# Patient Record
Sex: FEMALE | Race: WHITE
[De-identification: names, ages, dates, MRNs, and addresses within clinical notes are randomized per-mention and may not be internally consistent; named-entity substitution may affect disease eponyms.]

---

## 2018-12-13 ENCOUNTER — HOSPITAL ENCOUNTER (OUTPATIENT)
Age: 23
End: 2018-12-13
Payer: COMMERCIAL

## 2018-12-13 VITALS — BODY MASS INDEX: 24.9 KG/M2

## 2018-12-13 DIAGNOSIS — Z34.00: Primary | ICD-10-CM

## 2018-12-13 DIAGNOSIS — Z12.4: ICD-10-CM

## 2018-12-13 LAB
DEPRECATED RDW RBC: 43 FL (ref 35.1–43.9)
DIFFERENTIAL INDICATED: (no result)
ERYTHROCYTE [DISTWIDTH] IN BLOOD: 13.4 % (ref 11.6–14.6)
HCT VFR BLD AUTO: 39.9 % (ref 37–47)
HEMOGLOBIN: 13.5 G/DL (ref 12–15)
HGB BLD-MCNC: 13.5 G/DL (ref 12–15)
IMMATURE GRANULOCYTES COUNT: 0.01 X10^3/UL (ref 0–0)
MCV RBC: 88.9 FL (ref 81–99)
MEAN CORP HGB CONC: 33.8 G/GL (ref 32–36)
MEAN PLATELET VOL.: 11 FL (ref 6.2–12)
PLATELET # BLD: 220 K/MM3 (ref 150–450)
PLATELET COUNT: 220 K/MM3 (ref 150–450)
POSITIVE COUNT: NO
POSITIVE DIFFERENTIAL: NO
POSITIVE MORPHOLOGY: NO
RBC # BLD AUTO: 4.49 M/MM3 (ref 4.2–5.4)
RBC DISTRIBUTION WIDTH CV: 13.4 % (ref 11.6–14.6)
RBC DISTRIBUTION WIDTH SD: 43 FL (ref 35.1–43.9)
SAMPLE ADEQUACY CONTROL: (no result)
WBC # BLD AUTO: 8.2 K/MM3 (ref 4.4–11)
WHITE BLOOD COUNT: 8.2 K/MM3 (ref 4.4–11)

## 2018-12-13 PROCEDURE — 88175 CYTOPATH C/V AUTO FLUID REDO: CPT

## 2018-12-13 PROCEDURE — 87088 URINE BACTERIA CULTURE: CPT

## 2018-12-13 PROCEDURE — 86900 BLOOD TYPING SEROLOGIC ABO: CPT

## 2018-12-13 PROCEDURE — 86850 RBC ANTIBODY SCREEN: CPT

## 2018-12-13 PROCEDURE — 87340 HEPATITIS B SURFACE AG IA: CPT

## 2018-12-13 PROCEDURE — 86703 HIV-1/HIV-2 1 RESULT ANTBDY: CPT

## 2018-12-13 PROCEDURE — 87491 CHLMYD TRACH DNA AMP PROBE: CPT

## 2018-12-13 PROCEDURE — 86592 SYPHILIS TEST NON-TREP QUAL: CPT

## 2018-12-13 PROCEDURE — 87591 N.GONORRHOEAE DNA AMP PROB: CPT

## 2018-12-13 PROCEDURE — 86762 RUBELLA ANTIBODY: CPT

## 2018-12-13 PROCEDURE — 85025 COMPLETE CBC W/AUTO DIFF WBC: CPT

## 2018-12-13 PROCEDURE — 36415 COLL VENOUS BLD VENIPUNCTURE: CPT

## 2018-12-13 PROCEDURE — 87086 URINE CULTURE/COLONY COUNT: CPT

## 2018-12-13 PROCEDURE — G0145 SCR C/V CYTO,THINLAYER,RESCR: HCPCS

## 2018-12-13 PROCEDURE — 87077 CULTURE AEROBIC IDENTIFY: CPT

## 2018-12-13 PROCEDURE — 87186 SC STD MICRODIL/AGAR DIL: CPT

## 2018-12-13 PROCEDURE — 87624 HPV HI-RISK TYP POOLED RSLT: CPT

## 2018-12-14 LAB — RAPID PLASMIN REAGIN (RPR): NONREACTIVE

## 2019-01-14 ENCOUNTER — HOSPITAL ENCOUNTER (OUTPATIENT)
Dept: HOSPITAL 100 - SDC | Age: 24
Discharge: HOME | End: 2019-01-14
Payer: COMMERCIAL

## 2019-01-14 VITALS
DIASTOLIC BLOOD PRESSURE: 78 MMHG | HEART RATE: 81 BPM | OXYGEN SATURATION: 100 % | TEMPERATURE: 98.42 F | RESPIRATION RATE: 16 BRPM | SYSTOLIC BLOOD PRESSURE: 137 MMHG

## 2019-01-14 VITALS
RESPIRATION RATE: 18 BRPM | DIASTOLIC BLOOD PRESSURE: 76 MMHG | HEART RATE: 86 BPM | OXYGEN SATURATION: 100 % | SYSTOLIC BLOOD PRESSURE: 127 MMHG

## 2019-01-14 VITALS
RESPIRATION RATE: 16 BRPM | DIASTOLIC BLOOD PRESSURE: 78 MMHG | SYSTOLIC BLOOD PRESSURE: 133 MMHG | OXYGEN SATURATION: 100 % | HEART RATE: 78 BPM | TEMPERATURE: 98.96 F

## 2019-01-14 VITALS
OXYGEN SATURATION: 100 % | RESPIRATION RATE: 18 BRPM | HEART RATE: 76 BPM | TEMPERATURE: 98.24 F | SYSTOLIC BLOOD PRESSURE: 127 MMHG | DIASTOLIC BLOOD PRESSURE: 78 MMHG

## 2019-01-14 VITALS — BODY MASS INDEX: 24.9 KG/M2

## 2019-01-14 VITALS
RESPIRATION RATE: 18 BRPM | TEMPERATURE: 98.2 F | DIASTOLIC BLOOD PRESSURE: 64 MMHG | HEART RATE: 90 BPM | OXYGEN SATURATION: 100 % | SYSTOLIC BLOOD PRESSURE: 133 MMHG

## 2019-01-14 VITALS
DIASTOLIC BLOOD PRESSURE: 67 MMHG | OXYGEN SATURATION: 100 % | RESPIRATION RATE: 18 BRPM | SYSTOLIC BLOOD PRESSURE: 133 MMHG | HEART RATE: 87 BPM

## 2019-01-14 DIAGNOSIS — O03.4: Primary | ICD-10-CM

## 2019-01-14 DIAGNOSIS — Z3A.08: ICD-10-CM

## 2019-01-14 DIAGNOSIS — O99.281: ICD-10-CM

## 2019-01-14 DIAGNOSIS — E06.3: ICD-10-CM

## 2019-01-14 DIAGNOSIS — Z79.899: ICD-10-CM

## 2019-01-14 PROCEDURE — 88305 TISSUE EXAM BY PATHOLOGIST: CPT

## 2019-01-14 PROCEDURE — 59812 TREATMENT OF MISCARRIAGE: CPT

## 2019-05-07 ENCOUNTER — HOSPITAL ENCOUNTER (OUTPATIENT)
Age: 24
End: 2019-05-07
Payer: COMMERCIAL

## 2019-05-07 VITALS — BODY MASS INDEX: 24.9 KG/M2

## 2019-05-07 DIAGNOSIS — Z3A.00: ICD-10-CM

## 2019-05-07 DIAGNOSIS — O20.0: Primary | ICD-10-CM

## 2019-05-07 LAB — HCG SERPL QL: 31 MIU/ML (ref 1–3)

## 2019-05-07 PROCEDURE — 36415 COLL VENOUS BLD VENIPUNCTURE: CPT

## 2019-05-07 PROCEDURE — 86850 RBC ANTIBODY SCREEN: CPT

## 2019-05-07 PROCEDURE — 84702 CHORIONIC GONADOTROPIN TEST: CPT

## 2019-05-07 PROCEDURE — 86900 BLOOD TYPING SEROLOGIC ABO: CPT

## 2019-05-09 ENCOUNTER — HOSPITAL ENCOUNTER (OUTPATIENT)
Age: 24
End: 2019-05-09
Payer: COMMERCIAL

## 2019-05-09 VITALS — BODY MASS INDEX: 24.9 KG/M2

## 2019-05-09 DIAGNOSIS — O20.0: Primary | ICD-10-CM

## 2019-05-09 LAB — HCG SERPL QL: 95 MIU/ML (ref 1–3)

## 2019-05-09 PROCEDURE — 36415 COLL VENOUS BLD VENIPUNCTURE: CPT

## 2019-05-09 PROCEDURE — 84702 CHORIONIC GONADOTROPIN TEST: CPT

## 2019-05-28 ENCOUNTER — HOSPITAL ENCOUNTER (OUTPATIENT)
Age: 24
End: 2019-05-28
Payer: COMMERCIAL

## 2019-05-28 VITALS — BODY MASS INDEX: 24.9 KG/M2

## 2019-05-28 DIAGNOSIS — Z34.90: Primary | ICD-10-CM

## 2019-05-28 LAB
DEPRECATED RDW RBC: 42.7 FL (ref 35.1–43.9)
DIFFERENTIAL INDICATED: (no result)
ERYTHROCYTE [DISTWIDTH] IN BLOOD: 13.5 % (ref 11.6–14.6)
HCT VFR BLD AUTO: 39.2 % (ref 37–47)
HEMOGLOBIN: 13 G/DL (ref 12–15)
HGB BLD-MCNC: 13 G/DL (ref 12–15)
IMMATURE GRANULOCYTES COUNT: 0.02 X10^3/UL (ref 0–0)
MCV RBC: 86.3 FL (ref 81–99)
MEAN CORP HGB CONC: 33.2 G/GL (ref 32–36)
MEAN PLATELET VOL.: 10.8 FL (ref 6.2–12)
PLATELET # BLD: 218 K/MM3 (ref 150–450)
PLATELET COUNT: 218 K/MM3 (ref 150–450)
POSITIVE COUNT: NO
POSITIVE DIFFERENTIAL: NO
POSITIVE MORPHOLOGY: NO
RBC # BLD AUTO: 4.54 M/MM3 (ref 4.2–5.4)
RBC DISTRIBUTION WIDTH CV: 13.5 % (ref 11.6–14.6)
RBC DISTRIBUTION WIDTH SD: 42.7 FL (ref 35.1–43.9)
SAMPLE ADEQUACY CONTROL: (no result)
WBC # BLD AUTO: 8.2 K/MM3 (ref 4.4–11)
WHITE BLOOD COUNT: 8.2 K/MM3 (ref 4.4–11)

## 2019-05-28 PROCEDURE — 86850 RBC ANTIBODY SCREEN: CPT

## 2019-05-28 PROCEDURE — 87088 URINE BACTERIA CULTURE: CPT

## 2019-05-28 PROCEDURE — 87186 SC STD MICRODIL/AGAR DIL: CPT

## 2019-05-28 PROCEDURE — 86592 SYPHILIS TEST NON-TREP QUAL: CPT

## 2019-05-28 PROCEDURE — 87491 CHLMYD TRACH DNA AMP PROBE: CPT

## 2019-05-28 PROCEDURE — 86703 HIV-1/HIV-2 1 RESULT ANTBDY: CPT

## 2019-05-28 PROCEDURE — 87591 N.GONORRHOEAE DNA AMP PROB: CPT

## 2019-05-28 PROCEDURE — 86762 RUBELLA ANTIBODY: CPT

## 2019-05-28 PROCEDURE — 36415 COLL VENOUS BLD VENIPUNCTURE: CPT

## 2019-05-28 PROCEDURE — 86900 BLOOD TYPING SEROLOGIC ABO: CPT

## 2019-05-28 PROCEDURE — 87077 CULTURE AEROBIC IDENTIFY: CPT

## 2019-05-28 PROCEDURE — 85025 COMPLETE CBC W/AUTO DIFF WBC: CPT

## 2019-05-28 PROCEDURE — 87086 URINE CULTURE/COLONY COUNT: CPT

## 2019-05-28 PROCEDURE — 87340 HEPATITIS B SURFACE AG IA: CPT

## 2019-05-31 LAB — RAPID PLASMIN REAGIN (RPR): NONREACTIVE

## 2019-07-15 ENCOUNTER — HOSPITAL ENCOUNTER (OUTPATIENT)
Age: 24
End: 2019-07-15
Payer: COMMERCIAL

## 2019-07-15 VITALS — BODY MASS INDEX: 25.5 KG/M2

## 2019-07-15 DIAGNOSIS — N93.9: Primary | ICD-10-CM

## 2019-07-15 PROCEDURE — 76816 OB US FOLLOW-UP PER FETUS: CPT

## 2019-10-15 ENCOUNTER — HOSPITAL ENCOUNTER (OUTPATIENT)
Age: 24
End: 2019-10-15
Payer: COMMERCIAL

## 2019-10-15 VITALS — BODY MASS INDEX: 26.6 KG/M2

## 2019-10-15 DIAGNOSIS — O09.91: Primary | ICD-10-CM

## 2019-10-15 DIAGNOSIS — Z3A.00: ICD-10-CM

## 2019-10-15 LAB
DEPRECATED RDW RBC: 42.7 FL (ref 35.1–43.9)
ERYTHROCYTE [DISTWIDTH] IN BLOOD: 12.6 % (ref 11.6–14.6)
GLUCOSE 1H P 50 G GLC PO SERPL-MCNC: 85 MG/DL (ref 70–140)
HCT VFR BLD AUTO: 33.7 % (ref 37–47)
HEMOGLOBIN: 11.2 G/DL (ref 12–15)
HGB BLD-MCNC: 11.2 G/DL (ref 12–15)
IMMATURE GRANULOCYTES COUNT: 0.03 X10^3/UL (ref 0–0)
MCV RBC: 92.8 FL (ref 81–99)
MEAN CORP HGB CONC: 33.2 G/DL (ref 32–36)
MEAN PLATELET VOL.: 10.2 FL (ref 6.2–12)
NRBC FLAGGED BY ANALYZER: 0 % (ref 0–5)
PLATELET # BLD: 167 K/MM3 (ref 150–450)
PLATELET COUNT: 167 K/MM3 (ref 150–450)
RBC # BLD AUTO: 3.63 M/MM3 (ref 4.2–5.4)
RBC DISTRIBUTION WIDTH CV: 12.6 % (ref 11.6–14.6)
RBC DISTRIBUTION WIDTH SD: 42.7 FL (ref 35.1–43.9)
WBC # BLD AUTO: 10.4 K/MM3 (ref 4.4–11)
WHITE BLOOD COUNT: 10.4 K/MM3 (ref 4.4–11)

## 2019-10-15 PROCEDURE — 36415 COLL VENOUS BLD VENIPUNCTURE: CPT

## 2019-10-15 PROCEDURE — 85025 COMPLETE CBC W/AUTO DIFF WBC: CPT

## 2019-10-15 PROCEDURE — 82950 GLUCOSE TEST: CPT

## 2019-12-20 ENCOUNTER — HOSPITAL ENCOUNTER (OUTPATIENT)
Age: 24
End: 2019-12-20
Payer: COMMERCIAL

## 2019-12-20 VITALS — BODY MASS INDEX: 26.6 KG/M2

## 2019-12-20 DIAGNOSIS — Z3A.00: ICD-10-CM

## 2019-12-20 DIAGNOSIS — O09.91: Primary | ICD-10-CM

## 2019-12-20 PROCEDURE — 76817 TRANSVAGINAL US OBSTETRIC: CPT

## 2019-12-20 PROCEDURE — 76816 OB US FOLLOW-UP PER FETUS: CPT

## 2019-12-27 ENCOUNTER — HOSPITAL ENCOUNTER (OUTPATIENT)
Age: 24
Discharge: HOME | End: 2019-12-27
Payer: COMMERCIAL

## 2019-12-27 VITALS — BODY MASS INDEX: 29.9 KG/M2 | BODY MASS INDEX: 26.6 KG/M2

## 2019-12-27 LAB
DEPRECATED RDW RBC: 42.9 FL (ref 35.1–43.9)
ERYTHROCYTE [DISTWIDTH] IN BLOOD: 13.7 % (ref 11.6–14.6)
FIBRINOGEN PPP COAG.DERIVED-MCNC: 374 MG/DL (ref 203–444)
FIBRINOGEN: 374 MG/DL (ref 203–444)
HCT VFR BLD AUTO: 31.9 % (ref 37–47)
HEMOGLOBIN: 10.1 G/DL (ref 12–15)
HGB BLD-MCNC: 10.1 G/DL (ref 12–15)
IMMATURE GRANULOCYTES COUNT: 0.04 X10^3/UL (ref 0–0)
MCV RBC: 87.2 FL (ref 81–99)
MEAN CORP HGB CONC: 31.7 G/DL (ref 32–36)
MEAN PLATELET VOL.: 10.1 FL (ref 6.2–12)
NRBC FLAGGED BY ANALYZER: 0 % (ref 0–5)
PLATELET # BLD: 158 K/MM3 (ref 150–450)
PLATELET COUNT: 158 K/MM3 (ref 150–450)
RBC # BLD AUTO: 3.66 M/MM3 (ref 4.2–5.4)
RBC DISTRIBUTION WIDTH CV: 13.7 % (ref 11.6–14.6)
RBC DISTRIBUTION WIDTH SD: 42.9 FL (ref 35.1–43.9)
WBC # BLD AUTO: 9.4 K/MM3 (ref 4.4–11)
WHITE BLOOD COUNT: 9.4 K/MM3 (ref 4.4–11)

## 2019-12-27 PROCEDURE — 85025 COMPLETE CBC W/AUTO DIFF WBC: CPT

## 2019-12-27 PROCEDURE — 85384 FIBRINOGEN ACTIVITY: CPT

## 2019-12-27 PROCEDURE — 59050 FETAL MONITOR W/REPORT: CPT

## 2019-12-27 PROCEDURE — 59025 FETAL NON-STRESS TEST: CPT

## 2019-12-27 PROCEDURE — 99218: CPT

## 2019-12-27 PROCEDURE — G0378 HOSPITAL OBSERVATION PER HR: HCPCS

## 2019-12-30 ENCOUNTER — HOSPITAL ENCOUNTER (OUTPATIENT)
Dept: HOSPITAL 100 - WPOUT | Age: 24
Discharge: HOME | End: 2019-12-30
Payer: COMMERCIAL

## 2019-12-30 VITALS — BODY MASS INDEX: 29.9 KG/M2 | BODY MASS INDEX: 29.8 KG/M2

## 2019-12-30 DIAGNOSIS — O26.893: Primary | ICD-10-CM

## 2019-12-30 DIAGNOSIS — R03.0: ICD-10-CM

## 2019-12-30 DIAGNOSIS — Z3A.00: ICD-10-CM

## 2019-12-30 LAB
ALANINE AMINOTRANSFER ALT/SGPT: 15 U/L (ref 13–56)
APTT PPP: 28.4 SECONDS (ref 24.1–36.2)
AST(SGOT): 16 U/L (ref 15–37)
CREAT UR-MCNC: 37.9 MG/DL
DEPRECATED RDW RBC: 42.5 FL (ref 35.1–43.9)
ERYTHROCYTE [DISTWIDTH] IN BLOOD: 13.6 % (ref 11.6–14.6)
EST GLOM FILT RATE - AFR AMER: 141 ML/MIN (ref 60–?)
ESTIMATED CREATININE CLEARANCE: 114.48 ML/MIN
HCT VFR BLD AUTO: 30.9 % (ref 37–47)
HEMOGLOBIN: 9.7 G/DL (ref 12–15)
HGB BLD-MCNC: 9.7 G/DL (ref 12–15)
MCV RBC: 85.4 FL (ref 81–99)
MEAN CORP HGB CONC: 31.4 G/DL (ref 32–36)
MEAN PLATELET VOL.: 10.1 FL (ref 6.2–12)
PLATELET # BLD: 141 K/MM3 (ref 150–450)
PLATELET COUNT: 141 K/MM3 (ref 150–450)
PROT UR-MCNC: 8.5 MG/DL (ref ?–11.9)
PROT/CREAT UR: 224 MG/G CRE (ref 0–200)
PROTHROMBIN TIME (PROTIME)PT.: 12.9 SECONDS (ref 11.7–14.9)
RBC # BLD AUTO: 3.62 M/MM3 (ref 4.2–5.4)
RBC DISTRIBUTION WIDTH CV: 13.6 % (ref 11.6–14.6)
RBC DISTRIBUTION WIDTH SD: 42.5 FL (ref 35.1–43.9)
URATE SERPL-MCNC: 5.4 MG/DL (ref 2.6–6)
WBC # BLD AUTO: 8.8 K/MM3 (ref 4.4–11)
WHITE BLOOD COUNT: 8.8 K/MM3 (ref 4.4–11)

## 2019-12-30 PROCEDURE — G0378 HOSPITAL OBSERVATION PER HR: HCPCS

## 2019-12-30 PROCEDURE — 84550 ASSAY OF BLOOD/URIC ACID: CPT

## 2019-12-30 PROCEDURE — 85730 THROMBOPLASTIN TIME PARTIAL: CPT

## 2019-12-30 PROCEDURE — 84460 ALANINE AMINO (ALT) (SGPT): CPT

## 2019-12-30 PROCEDURE — 99218: CPT

## 2019-12-30 PROCEDURE — 84156 ASSAY OF PROTEIN URINE: CPT

## 2019-12-30 PROCEDURE — 82565 ASSAY OF CREATININE: CPT

## 2019-12-30 PROCEDURE — 36415 COLL VENOUS BLD VENIPUNCTURE: CPT

## 2019-12-30 PROCEDURE — 84450 TRANSFERASE (AST) (SGOT): CPT

## 2019-12-30 PROCEDURE — 59025 FETAL NON-STRESS TEST: CPT

## 2019-12-30 PROCEDURE — 59050 FETAL MONITOR W/REPORT: CPT

## 2019-12-30 PROCEDURE — 85610 PROTHROMBIN TIME: CPT

## 2019-12-30 PROCEDURE — 85027 COMPLETE CBC AUTOMATED: CPT

## 2019-12-30 PROCEDURE — 82570 ASSAY OF URINE CREATININE: CPT

## 2020-01-04 ENCOUNTER — HOSPITAL ENCOUNTER (INPATIENT)
Dept: HOSPITAL 100 - WPOUT | Age: 25
LOS: 1 days | Discharge: HOME | End: 2020-01-05
Payer: COMMERCIAL

## 2020-01-04 VITALS
HEART RATE: 82 BPM | TEMPERATURE: 98.24 F | DIASTOLIC BLOOD PRESSURE: 68 MMHG | RESPIRATION RATE: 16 BRPM | SYSTOLIC BLOOD PRESSURE: 130 MMHG

## 2020-01-04 VITALS
HEART RATE: 91 BPM | TEMPERATURE: 97.9 F | SYSTOLIC BLOOD PRESSURE: 125 MMHG | RESPIRATION RATE: 16 BRPM | DIASTOLIC BLOOD PRESSURE: 67 MMHG

## 2020-01-04 VITALS — BODY MASS INDEX: 29.8 KG/M2 | BODY MASS INDEX: 29.7 KG/M2

## 2020-01-04 DIAGNOSIS — Z79.899: ICD-10-CM

## 2020-01-04 DIAGNOSIS — E06.3: ICD-10-CM

## 2020-01-04 DIAGNOSIS — Z3A.38: ICD-10-CM

## 2020-01-04 DIAGNOSIS — D64.9: ICD-10-CM

## 2020-01-04 LAB
DEPRECATED RDW RBC: 43 FL (ref 35.1–43.9)
ERYTHROCYTE [DISTWIDTH] IN BLOOD: 13.9 % (ref 11.6–14.6)
HCT VFR BLD AUTO: 30.3 % (ref 37–47)
HEMOGLOBIN: 9.6 G/DL (ref 12–15)
HGB BLD-MCNC: 9.6 G/DL (ref 12–15)
IMMATURE GRANULOCYTES COUNT: 0.04 X10^3/UL (ref 0–0)
MCV RBC: 84.6 FL (ref 81–99)
MEAN CORP HGB CONC: 31.7 G/DL (ref 32–36)
MEAN PLATELET VOL.: 10.3 FL (ref 6.2–12)
NRBC FLAGGED BY ANALYZER: 0 % (ref 0–5)
PLATELET # BLD: 152 K/MM3 (ref 150–450)
PLATELET COUNT: 152 K/MM3 (ref 150–450)
RBC # BLD AUTO: 3.58 M/MM3 (ref 4.2–5.4)
RBC DISTRIBUTION WIDTH CV: 13.9 % (ref 11.6–14.6)
RBC DISTRIBUTION WIDTH SD: 43 FL (ref 35.1–43.9)
ROM INTERNAL CONTROL TEST: (no result)
ROM PATIENT TEST: POSITIVE
WBC # BLD AUTO: 10.9 K/MM3 (ref 4.4–11)
WHITE BLOOD COUNT: 10.9 K/MM3 (ref 4.4–11)

## 2020-01-04 PROCEDURE — 59050 FETAL MONITOR W/REPORT: CPT

## 2020-01-04 PROCEDURE — 86901 BLOOD TYPING SEROLOGIC RH(D): CPT

## 2020-01-04 PROCEDURE — 59025 FETAL NON-STRESS TEST: CPT

## 2020-01-04 PROCEDURE — 84112 EVAL AMNIOTIC FLUID PROTEIN: CPT

## 2020-01-04 PROCEDURE — G0378 HOSPITAL OBSERVATION PER HR: HCPCS

## 2020-01-04 PROCEDURE — 85025 COMPLETE CBC W/AUTO DIFF WBC: CPT

## 2020-01-04 PROCEDURE — 86900 BLOOD TYPING SEROLOGIC ABO: CPT

## 2020-01-04 PROCEDURE — 99218: CPT

## 2020-01-04 PROCEDURE — 86850 RBC ANTIBODY SCREEN: CPT

## 2020-01-04 RX ADMIN — Medication 0 ML: at 08:33

## 2020-01-05 VITALS
HEART RATE: 83 BPM | RESPIRATION RATE: 16 BRPM | DIASTOLIC BLOOD PRESSURE: 69 MMHG | SYSTOLIC BLOOD PRESSURE: 141 MMHG | TEMPERATURE: 98.24 F

## 2020-01-05 VITALS
RESPIRATION RATE: 16 BRPM | HEART RATE: 81 BPM | TEMPERATURE: 98 F | DIASTOLIC BLOOD PRESSURE: 71 MMHG | SYSTOLIC BLOOD PRESSURE: 121 MMHG

## 2020-01-05 VITALS
RESPIRATION RATE: 16 BRPM | TEMPERATURE: 98.1 F | SYSTOLIC BLOOD PRESSURE: 129 MMHG | HEART RATE: 97 BPM | DIASTOLIC BLOOD PRESSURE: 76 MMHG

## 2020-01-05 VITALS
HEART RATE: 88 BPM | TEMPERATURE: 98.24 F | SYSTOLIC BLOOD PRESSURE: 129 MMHG | DIASTOLIC BLOOD PRESSURE: 57 MMHG | RESPIRATION RATE: 16 BRPM

## 2020-01-05 RX ADMIN — DOCUSATE SODIUM 50 MG AND SENNOSIDES 8.6 MG 0 TABLET: 8.6; 5 TABLET, FILM COATED ORAL at 07:39

## 2020-01-08 ENCOUNTER — HOSPITAL ENCOUNTER (OUTPATIENT)
Age: 25
End: 2020-01-08
Payer: COMMERCIAL

## 2020-01-08 VITALS — BODY MASS INDEX: 29.7 KG/M2

## 2020-01-08 DIAGNOSIS — Z3A.00: ICD-10-CM

## 2020-01-08 DIAGNOSIS — O99.019: Primary | ICD-10-CM

## 2020-01-08 DIAGNOSIS — D64.9: ICD-10-CM

## 2020-01-08 LAB
DEPRECATED RDW RBC: 45.1 FL (ref 35.1–43.9)
ERYTHROCYTE [DISTWIDTH] IN BLOOD: 14.1 % (ref 11.6–14.6)
HCT VFR BLD AUTO: 27.3 % (ref 37–47)
HEMOGLOBIN: 8.3 G/DL (ref 12–15)
HGB BLD-MCNC: 8.3 G/DL (ref 12–15)
IMMATURE GRANULOCYTES COUNT: 0.04 X10^3/UL (ref 0–0)
MCV RBC: 88.9 FL (ref 81–99)
MEAN CORP HGB CONC: 30.4 G/DL (ref 32–36)
MEAN PLATELET VOL.: 10.5 FL (ref 6.2–12)
NRBC FLAGGED BY ANALYZER: 0 % (ref 0–5)
PLATELET # BLD: 182 K/MM3 (ref 150–450)
PLATELET COUNT: 182 K/MM3 (ref 150–450)
RBC # BLD AUTO: 3.07 M/MM3 (ref 4.2–5.4)
RBC DISTRIBUTION WIDTH CV: 14.1 % (ref 11.6–14.6)
RBC DISTRIBUTION WIDTH SD: 45.1 FL (ref 35.1–43.9)
WBC # BLD AUTO: 9 K/MM3 (ref 4.4–11)
WHITE BLOOD COUNT: 9 K/MM3 (ref 4.4–11)

## 2020-01-08 PROCEDURE — 36415 COLL VENOUS BLD VENIPUNCTURE: CPT

## 2020-01-08 PROCEDURE — 85025 COMPLETE CBC W/AUTO DIFF WBC: CPT

## 2020-01-10 ENCOUNTER — HOSPITAL ENCOUNTER (OUTPATIENT)
Age: 25
End: 2020-01-10
Payer: COMMERCIAL

## 2020-01-10 VITALS — BODY MASS INDEX: 28.3 KG/M2 | BODY MASS INDEX: 29.7 KG/M2

## 2020-01-10 VITALS — DIASTOLIC BLOOD PRESSURE: 71 MMHG | HEART RATE: 70 BPM | SYSTOLIC BLOOD PRESSURE: 118 MMHG | RESPIRATION RATE: 16 BRPM

## 2020-01-10 VITALS
DIASTOLIC BLOOD PRESSURE: 76 MMHG | TEMPERATURE: 98.2 F | SYSTOLIC BLOOD PRESSURE: 119 MMHG | RESPIRATION RATE: 16 BRPM | HEART RATE: 76 BPM

## 2020-01-10 DIAGNOSIS — D64.9: ICD-10-CM

## 2020-01-10 PROCEDURE — 96365 THER/PROPH/DIAG IV INF INIT: CPT

## 2020-01-10 PROCEDURE — A4216 STERILE WATER/SALINE, 10 ML: HCPCS

## 2020-01-10 PROCEDURE — 96366 THER/PROPH/DIAG IV INF ADDON: CPT

## 2020-01-16 ENCOUNTER — HOSPITAL ENCOUNTER (OUTPATIENT)
Age: 25
End: 2020-01-16
Payer: COMMERCIAL

## 2020-01-16 VITALS — BODY MASS INDEX: 28.3 KG/M2

## 2020-01-16 DIAGNOSIS — N39.0: Primary | ICD-10-CM

## 2020-01-16 PROCEDURE — 87088 URINE BACTERIA CULTURE: CPT

## 2020-01-16 PROCEDURE — 87086 URINE CULTURE/COLONY COUNT: CPT

## 2020-01-17 ENCOUNTER — HOSPITAL ENCOUNTER (OUTPATIENT)
Age: 25
End: 2020-01-17
Payer: COMMERCIAL

## 2020-01-17 VITALS
RESPIRATION RATE: 16 BRPM | TEMPERATURE: 97.1 F | HEART RATE: 75 BPM | DIASTOLIC BLOOD PRESSURE: 64 MMHG | SYSTOLIC BLOOD PRESSURE: 134 MMHG | OXYGEN SATURATION: 99 %

## 2020-01-17 VITALS — BODY MASS INDEX: 28.3 KG/M2 | BODY MASS INDEX: 26.6 KG/M2

## 2020-01-17 DIAGNOSIS — D64.9: ICD-10-CM

## 2020-01-17 PROCEDURE — A4216 STERILE WATER/SALINE, 10 ML: HCPCS

## 2020-01-17 PROCEDURE — 96366 THER/PROPH/DIAG IV INF ADDON: CPT

## 2020-01-17 PROCEDURE — 96365 THER/PROPH/DIAG IV INF INIT: CPT

## 2020-01-21 ENCOUNTER — HOSPITAL ENCOUNTER (OUTPATIENT)
Age: 25
End: 2020-01-21
Payer: COMMERCIAL

## 2020-01-21 VITALS — BODY MASS INDEX: 26.1 KG/M2

## 2020-01-21 DIAGNOSIS — O92.79: Primary | ICD-10-CM

## 2020-01-21 PROCEDURE — 96152: CPT

## 2020-01-24 ENCOUNTER — HOSPITAL ENCOUNTER (OUTPATIENT)
Age: 25
End: 2020-01-24
Payer: COMMERCIAL

## 2020-01-24 VITALS — BODY MASS INDEX: 28.3 KG/M2 | BODY MASS INDEX: 26.1 KG/M2 | BODY MASS INDEX: 26.6 KG/M2

## 2020-01-24 VITALS
HEART RATE: 74 BPM | RESPIRATION RATE: 16 BRPM | DIASTOLIC BLOOD PRESSURE: 70 MMHG | TEMPERATURE: 98 F | SYSTOLIC BLOOD PRESSURE: 134 MMHG

## 2020-01-24 VITALS — RESPIRATION RATE: 18 BRPM | DIASTOLIC BLOOD PRESSURE: 67 MMHG | HEART RATE: 71 BPM | SYSTOLIC BLOOD PRESSURE: 116 MMHG

## 2020-01-24 PROCEDURE — 96366 THER/PROPH/DIAG IV INF ADDON: CPT

## 2020-01-24 PROCEDURE — A4216 STERILE WATER/SALINE, 10 ML: HCPCS

## 2020-01-24 PROCEDURE — 96365 THER/PROPH/DIAG IV INF INIT: CPT

## 2020-02-20 ENCOUNTER — HOSPITAL ENCOUNTER (OUTPATIENT)
Age: 25
End: 2020-02-20
Payer: COMMERCIAL

## 2020-02-20 VITALS — BODY MASS INDEX: 26.1 KG/M2

## 2020-02-20 DIAGNOSIS — D64.9: Primary | ICD-10-CM

## 2020-02-20 LAB
DEPRECATED RDW RBC: 47.1 FL (ref 35.1–43.9)
ERYTHROCYTE [DISTWIDTH] IN BLOOD: 14.6 % (ref 11.6–14.6)
HCT VFR BLD AUTO: 40.1 % (ref 37–47)
HEMOGLOBIN: 12.4 G/DL (ref 12–15)
HGB BLD-MCNC: 12.4 G/DL (ref 12–15)
IMMATURE GRANULOCYTES COUNT: 0.01 X10^3/UL (ref 0–0)
MCV RBC: 87.9 FL (ref 81–99)
MEAN CORP HGB CONC: 30.9 G/DL (ref 32–36)
MEAN PLATELET VOL.: 10.7 FL (ref 6.2–12)
NRBC FLAGGED BY ANALYZER: 0 % (ref 0–5)
PLATELET # BLD: 179 K/MM3 (ref 150–450)
PLATELET COUNT: 179 K/MM3 (ref 150–450)
RBC # BLD AUTO: 4.56 M/MM3 (ref 4.2–5.4)
RBC DISTRIBUTION WIDTH CV: 14.6 % (ref 11.6–14.6)
RBC DISTRIBUTION WIDTH SD: 47.1 FL (ref 35.1–43.9)
WBC # BLD AUTO: 6.6 K/MM3 (ref 4.4–11)
WHITE BLOOD COUNT: 6.6 K/MM3 (ref 4.4–11)

## 2020-02-20 PROCEDURE — 85025 COMPLETE CBC W/AUTO DIFF WBC: CPT

## 2020-02-20 PROCEDURE — 36415 COLL VENOUS BLD VENIPUNCTURE: CPT

## 2022-04-05 ENCOUNTER — HOSPITAL ENCOUNTER (OUTPATIENT)
Dept: HOSPITAL 100 - PAVLAB | Age: 27
Discharge: HOME | End: 2022-04-05
Payer: COMMERCIAL

## 2022-04-05 DIAGNOSIS — O36.80X0: Primary | ICD-10-CM

## 2022-04-05 LAB — HCG SERPL QL: (no result) MIU/ML (ref 1–3)

## 2022-04-05 PROCEDURE — 36415 COLL VENOUS BLD VENIPUNCTURE: CPT

## 2022-04-05 PROCEDURE — 84702 CHORIONIC GONADOTROPIN TEST: CPT

## 2022-04-06 ENCOUNTER — HOSPITAL ENCOUNTER (OUTPATIENT)
Dept: HOSPITAL 100 - US | Age: 27
Discharge: HOME | End: 2022-04-06
Payer: COMMERCIAL

## 2022-04-06 DIAGNOSIS — O36.80X0: Primary | ICD-10-CM

## 2022-04-06 PROCEDURE — 76817 TRANSVAGINAL US OBSTETRIC: CPT

## 2022-05-04 ENCOUNTER — HOSPITAL ENCOUNTER (OUTPATIENT)
Dept: HOSPITAL 100 - OPUS | Age: 27
Discharge: HOME | End: 2022-05-04
Payer: COMMERCIAL

## 2022-05-04 DIAGNOSIS — Z34.90: Primary | ICD-10-CM

## 2022-05-04 PROCEDURE — 76801 OB US < 14 WKS SINGLE FETUS: CPT

## 2022-05-11 ENCOUNTER — HOSPITAL ENCOUNTER (OUTPATIENT)
Dept: HOSPITAL 100 - LABSPEC | Age: 27
Discharge: HOME | End: 2022-05-11
Payer: COMMERCIAL

## 2022-05-11 DIAGNOSIS — Z3A.00: ICD-10-CM

## 2022-05-11 DIAGNOSIS — Z34.90: Primary | ICD-10-CM

## 2022-05-11 LAB
AMPHET UR-MCNC: NEGATIVE NG/ML
BARBITURATE URINE VISTA: NEGATIVE
BENZODIAZEPINE URINE VISTA: NEGATIVE
COCAINE URINE VISTA: NEGATIVE
DRUG CONFIRMATION TO FOLLOW?: (no result)
ECSTACY URINE VISTA: NEGATIVE
METHADONE URINE VISTA: NEGATIVE
PCP UR QL: NEGATIVE
PH UR: 7 [PH]
THC URINE VISTA: NEGATIVE

## 2022-05-11 PROCEDURE — 80307 DRUG TEST PRSMV CHEM ANLYZR: CPT

## 2022-05-11 PROCEDURE — 87088 URINE BACTERIA CULTURE: CPT

## 2022-05-11 PROCEDURE — 87086 URINE CULTURE/COLONY COUNT: CPT

## 2022-06-07 ENCOUNTER — HOSPITAL ENCOUNTER (OUTPATIENT)
Dept: HOSPITAL 100 - PAVLAB | Age: 27
Discharge: HOME | End: 2022-06-07
Payer: COMMERCIAL

## 2022-06-07 DIAGNOSIS — Z34.90: Primary | ICD-10-CM

## 2022-06-07 DIAGNOSIS — R87.610: ICD-10-CM

## 2022-06-07 LAB
DEPRECATED RDW RBC: 46 FL (ref 35.1–43.9)
ERYTHROCYTE [DISTWIDTH] IN BLOOD: 13.7 % (ref 11.6–14.6)
HCT VFR BLD AUTO: 36.3 % (ref 37–47)
HEMOGLOBIN: 12.3 G/DL (ref 12–15)
HGB BLD-MCNC: 12.3 G/DL (ref 12–15)
IMMATURE GRANULOCYTES COUNT: 0.03 X10^3/UL (ref 0–0)
MCV RBC: 91.9 FL (ref 81–99)
MEAN CORP HGB CONC: 33.9 G/DL (ref 32–36)
MEAN PLATELET VOL.: 10.3 FL (ref 6.2–12)
NRBC FLAGGED BY ANALYZER: 0 % (ref 0–5)
PLATELET # BLD: 170 K/MM3 (ref 150–450)
PLATELET COUNT: 170 K/MM3 (ref 150–450)
RBC # BLD AUTO: 3.95 M/MM3 (ref 4.2–5.4)
RBC DISTRIBUTION WIDTH CV: 13.7 % (ref 11.6–14.6)
RBC DISTRIBUTION WIDTH SD: 46 FL (ref 35.1–43.9)
WBC # BLD AUTO: 8.7 K/MM3 (ref 4.4–11)
WHITE BLOOD COUNT: 8.7 K/MM3 (ref 4.4–11)

## 2022-06-07 PROCEDURE — 87340 HEPATITIS B SURFACE AG IA: CPT

## 2022-06-07 PROCEDURE — 86901 BLOOD TYPING SEROLOGIC RH(D): CPT

## 2022-06-07 PROCEDURE — 87491 CHLMYD TRACH DNA AMP PROBE: CPT

## 2022-06-07 PROCEDURE — 86762 RUBELLA ANTIBODY: CPT

## 2022-06-07 PROCEDURE — 86850 RBC ANTIBODY SCREEN: CPT

## 2022-06-07 PROCEDURE — G0145 SCR C/V CYTO,THINLAYER,RESCR: HCPCS

## 2022-06-07 PROCEDURE — 85025 COMPLETE CBC W/AUTO DIFF WBC: CPT

## 2022-06-07 PROCEDURE — 86803 HEPATITIS C AB TEST: CPT

## 2022-06-07 PROCEDURE — 86780 TREPONEMA PALLIDUM: CPT

## 2022-06-07 PROCEDURE — 86703 HIV-1/HIV-2 1 RESULT ANTBDY: CPT

## 2022-06-07 PROCEDURE — 87591 N.GONORRHOEAE DNA AMP PROB: CPT

## 2022-06-07 PROCEDURE — 36415 COLL VENOUS BLD VENIPUNCTURE: CPT

## 2022-06-07 PROCEDURE — 88175 CYTOPATH C/V AUTO FLUID REDO: CPT

## 2022-06-07 PROCEDURE — 86900 BLOOD TYPING SEROLOGIC ABO: CPT

## 2022-08-29 ENCOUNTER — HOSPITAL ENCOUNTER (OUTPATIENT)
Dept: HOSPITAL 100 - PAVLAB | Age: 27
Discharge: HOME | End: 2022-08-29
Payer: COMMERCIAL

## 2022-08-29 DIAGNOSIS — Z34.82: Primary | ICD-10-CM

## 2022-08-29 LAB
DEPRECATED RDW RBC: 46.5 FL (ref 35.1–43.9)
ERYTHROCYTE [DISTWIDTH] IN BLOOD: 13.4 % (ref 11.6–14.6)
GLUCOSE 1H P 50 G GLC PO SERPL-MCNC: 86 MG/DL (ref 70–140)
HCT VFR BLD AUTO: 33.6 % (ref 37–47)
HEMOGLOBIN: 11.1 G/DL (ref 12–15)
HGB BLD-MCNC: 11.1 G/DL (ref 12–15)
IMMATURE GRANULOCYTES COUNT: 0.03 X10^3/UL (ref 0–0)
MCV RBC: 94.9 FL (ref 81–99)
MEAN CORP HGB CONC: 33 G/DL (ref 32–36)
MEAN PLATELET VOL.: 10 FL (ref 6.2–12)
NRBC FLAGGED BY ANALYZER: 0 % (ref 0–5)
PLATELET # BLD: 153 K/MM3 (ref 150–450)
PLATELET COUNT: 153 K/MM3 (ref 150–450)
RBC # BLD AUTO: 3.54 M/MM3 (ref 4.2–5.4)
RBC DISTRIBUTION WIDTH CV: 13.4 % (ref 11.6–14.6)
RBC DISTRIBUTION WIDTH SD: 46.5 FL (ref 35.1–43.9)
WBC # BLD AUTO: 8.2 K/MM3 (ref 4.4–11)
WHITE BLOOD COUNT: 8.2 K/MM3 (ref 4.4–11)

## 2022-08-29 PROCEDURE — 36415 COLL VENOUS BLD VENIPUNCTURE: CPT

## 2022-08-29 PROCEDURE — 82950 GLUCOSE TEST: CPT

## 2022-08-29 PROCEDURE — 85025 COMPLETE CBC W/AUTO DIFF WBC: CPT

## 2022-11-02 ENCOUNTER — HOSPITAL ENCOUNTER (OUTPATIENT)
Dept: HOSPITAL 100 - LABSPEC | Age: 27
Discharge: HOME | End: 2022-11-02
Payer: COMMERCIAL

## 2022-11-02 DIAGNOSIS — Z34.90: Primary | ICD-10-CM

## 2022-11-02 PROCEDURE — 87081 CULTURE SCREEN ONLY: CPT

## 2022-11-02 PROCEDURE — 87077 CULTURE AEROBIC IDENTIFY: CPT

## 2022-11-02 PROCEDURE — 87186 SC STD MICRODIL/AGAR DIL: CPT

## 2022-11-21 ENCOUNTER — HOSPITAL ENCOUNTER (INPATIENT)
Dept: HOSPITAL 100 - WPOUT | Age: 27
LOS: 1 days | Discharge: HOME | DRG: 776 | End: 2022-11-22
Payer: COMMERCIAL

## 2022-11-21 VITALS — DIASTOLIC BLOOD PRESSURE: 73 MMHG | SYSTOLIC BLOOD PRESSURE: 114 MMHG | HEART RATE: 121 BPM

## 2022-11-21 VITALS — HEART RATE: 115 BPM | DIASTOLIC BLOOD PRESSURE: 71 MMHG | SYSTOLIC BLOOD PRESSURE: 121 MMHG

## 2022-11-21 VITALS — DIASTOLIC BLOOD PRESSURE: 77 MMHG | SYSTOLIC BLOOD PRESSURE: 117 MMHG | HEART RATE: 108 BPM

## 2022-11-21 VITALS — SYSTOLIC BLOOD PRESSURE: 118 MMHG | HEART RATE: 115 BPM | DIASTOLIC BLOOD PRESSURE: 72 MMHG

## 2022-11-21 VITALS — OXYGEN SATURATION: 99 % | HEART RATE: 111 BPM

## 2022-11-21 VITALS — HEART RATE: 120 BPM | DIASTOLIC BLOOD PRESSURE: 73 MMHG | SYSTOLIC BLOOD PRESSURE: 121 MMHG

## 2022-11-21 VITALS — HEART RATE: 113 BPM | SYSTOLIC BLOOD PRESSURE: 119 MMHG | DIASTOLIC BLOOD PRESSURE: 73 MMHG

## 2022-11-21 VITALS — HEART RATE: 111 BPM | DIASTOLIC BLOOD PRESSURE: 76 MMHG | SYSTOLIC BLOOD PRESSURE: 122 MMHG

## 2022-11-21 VITALS — BODY MASS INDEX: 31.8 KG/M2

## 2022-11-21 DIAGNOSIS — Z28.310: ICD-10-CM

## 2022-11-21 DIAGNOSIS — D62: ICD-10-CM

## 2022-11-21 LAB
ALANINE AMINOTRANSFER ALT/SGPT: 36 U/L (ref 13–56)
ALBUMIN SERPL-MCNC: 2.5 G/DL (ref 3.2–5)
ALKALINE PHOSPHATASE: 102 U/L (ref 45–117)
ANION GAP: 7 (ref 5–15)
AST(SGOT): 34 U/L (ref 15–37)
BUN SERPL-MCNC: 10 MG/DL (ref 7–18)
BUN/CREAT RATIO: 14.9 RATIO (ref 10–20)
CALCIUM SERPL-MCNC: 10.2 MG/DL (ref 8.5–10.1)
CARBON DIOXIDE: 24 MMOL/L (ref 21–32)
CHLORIDE: 110 MMOL/L (ref 98–107)
CREAT UR-MCNC: 47.6 MG/DL
DEPRECATED RDW RBC: 49.6 FL (ref 35.1–43.9)
ERYTHROCYTE [DISTWIDTH] IN BLOOD: 15.1 % (ref 11.6–14.6)
EST GLOM FILT RATE - AFR AMER: 137 ML/MIN (ref 60–?)
ESTIMATED CREATININE CLEARANCE: 109.88 ML/MIN
GLOBULIN: 3.5 G/DL (ref 2.2–4.2)
GLUCOSE: 102 MG/DL (ref 74–106)
HCT VFR BLD AUTO: 19.9 % (ref 37–47)
HEMOGLOBIN: 6 G/DL (ref 12–15)
HGB BLD-MCNC: 6 G/DL (ref 12–15)
IMMATURE GRANULOCYTES COUNT: 0.15 X10^3/UL (ref 0–0)
MCV RBC: 93 FL (ref 81–99)
MEAN CORP HGB CONC: 30.2 G/DL (ref 32–36)
MEAN PLATELET VOL.: 10.2 FL (ref 6.2–12)
NRBC FLAGGED BY ANALYZER: 0.4 % (ref 0–5)
PLATELET # BLD: 150 K/MM3 (ref 150–450)
PLATELET COUNT: 150 K/MM3 (ref 150–450)
POTASSIUM: 3.6 MMOL/L (ref 3.5–5.1)
PROT UR-MCNC: 8.6 MG/DL (ref ?–11.9)
PROT/CREAT UR: 181 MG/G CRE (ref 0–200)
RBC # BLD AUTO: 2.14 M/MM3 (ref 4.2–5.4)
RBC DISTRIBUTION WIDTH CV: 15.1 % (ref 11.6–14.6)
RBC DISTRIBUTION WIDTH SD: 49.6 FL (ref 35.1–43.9)
WBC # BLD AUTO: 9.5 K/MM3 (ref 4.4–11)
WHITE BLOOD COUNT: 9.5 K/MM3 (ref 4.4–11)

## 2022-11-21 PROCEDURE — 99218: CPT

## 2022-11-21 PROCEDURE — 86920 COMPATIBILITY TEST SPIN: CPT

## 2022-11-21 PROCEDURE — 86900 BLOOD TYPING SEROLOGIC ABO: CPT

## 2022-11-21 PROCEDURE — 84156 ASSAY OF PROTEIN URINE: CPT

## 2022-11-21 PROCEDURE — 85025 COMPLETE CBC W/AUTO DIFF WBC: CPT

## 2022-11-21 PROCEDURE — G0378 HOSPITAL OBSERVATION PER HR: HCPCS

## 2022-11-21 PROCEDURE — 82570 ASSAY OF URINE CREATININE: CPT

## 2022-11-21 PROCEDURE — 86850 RBC ANTIBODY SCREEN: CPT

## 2022-11-21 PROCEDURE — 86901 BLOOD TYPING SEROLOGIC RH(D): CPT

## 2022-11-21 PROCEDURE — 36415 COLL VENOUS BLD VENIPUNCTURE: CPT

## 2022-11-21 PROCEDURE — P9016 RBC LEUKOCYTES REDUCED: HCPCS

## 2022-11-21 PROCEDURE — 80053 COMPREHEN METABOLIC PANEL: CPT

## 2022-11-21 PROCEDURE — 86922 COMPATIBILITY TEST ANTIGLOB: CPT

## 2022-11-22 VITALS
SYSTOLIC BLOOD PRESSURE: 122 MMHG | DIASTOLIC BLOOD PRESSURE: 67 MMHG | OXYGEN SATURATION: 99 % | TEMPERATURE: 98.6 F | RESPIRATION RATE: 18 BRPM | HEART RATE: 101 BPM

## 2022-11-22 VITALS — HEART RATE: 106 BPM | SYSTOLIC BLOOD PRESSURE: 140 MMHG | DIASTOLIC BLOOD PRESSURE: 77 MMHG

## 2022-11-22 VITALS
HEART RATE: 108 BPM | OXYGEN SATURATION: 99 % | RESPIRATION RATE: 18 BRPM | DIASTOLIC BLOOD PRESSURE: 75 MMHG | TEMPERATURE: 98.78 F | SYSTOLIC BLOOD PRESSURE: 133 MMHG

## 2022-11-22 VITALS
DIASTOLIC BLOOD PRESSURE: 84 MMHG | SYSTOLIC BLOOD PRESSURE: 138 MMHG | TEMPERATURE: 99.14 F | HEART RATE: 102 BPM | RESPIRATION RATE: 18 BRPM | OXYGEN SATURATION: 99 %

## 2022-11-22 VITALS
TEMPERATURE: 98.6 F | SYSTOLIC BLOOD PRESSURE: 132 MMHG | OXYGEN SATURATION: 99 % | DIASTOLIC BLOOD PRESSURE: 68 MMHG | HEART RATE: 111 BPM | RESPIRATION RATE: 16 BRPM

## 2022-11-22 VITALS
DIASTOLIC BLOOD PRESSURE: 75 MMHG | TEMPERATURE: 98.7 F | SYSTOLIC BLOOD PRESSURE: 136 MMHG | RESPIRATION RATE: 18 BRPM | HEART RATE: 100 BPM

## 2022-11-22 VITALS — SYSTOLIC BLOOD PRESSURE: 116 MMHG | HEART RATE: 103 BPM | DIASTOLIC BLOOD PRESSURE: 60 MMHG

## 2022-11-22 VITALS
TEMPERATURE: 99.14 F | DIASTOLIC BLOOD PRESSURE: 63 MMHG | SYSTOLIC BLOOD PRESSURE: 116 MMHG | OXYGEN SATURATION: 99 % | RESPIRATION RATE: 16 BRPM | HEART RATE: 106 BPM

## 2022-11-22 VITALS — SYSTOLIC BLOOD PRESSURE: 125 MMHG | HEART RATE: 118 BPM | DIASTOLIC BLOOD PRESSURE: 76 MMHG

## 2022-11-22 VITALS — OXYGEN SATURATION: 99 % | HEART RATE: 117 BPM

## 2022-11-22 VITALS — HEART RATE: 106 BPM | DIASTOLIC BLOOD PRESSURE: 77 MMHG | SYSTOLIC BLOOD PRESSURE: 135 MMHG

## 2022-11-22 VITALS — SYSTOLIC BLOOD PRESSURE: 121 MMHG | HEART RATE: 100 BPM | DIASTOLIC BLOOD PRESSURE: 62 MMHG

## 2022-11-22 VITALS
HEART RATE: 101 BPM | TEMPERATURE: 98.9 F | OXYGEN SATURATION: 99 % | RESPIRATION RATE: 16 BRPM | DIASTOLIC BLOOD PRESSURE: 60 MMHG | SYSTOLIC BLOOD PRESSURE: 116 MMHG

## 2022-11-22 VITALS
TEMPERATURE: 98.1 F | SYSTOLIC BLOOD PRESSURE: 124 MMHG | RESPIRATION RATE: 16 BRPM | HEART RATE: 100 BPM | OXYGEN SATURATION: 99 % | DIASTOLIC BLOOD PRESSURE: 66 MMHG

## 2022-11-22 VITALS
RESPIRATION RATE: 16 BRPM | HEART RATE: 100 BPM | OXYGEN SATURATION: 99 % | SYSTOLIC BLOOD PRESSURE: 121 MMHG | TEMPERATURE: 99.2 F | DIASTOLIC BLOOD PRESSURE: 62 MMHG

## 2022-11-22 VITALS — DIASTOLIC BLOOD PRESSURE: 76 MMHG | HEART RATE: 112 BPM | SYSTOLIC BLOOD PRESSURE: 140 MMHG

## 2022-11-22 VITALS
RESPIRATION RATE: 16 BRPM | SYSTOLIC BLOOD PRESSURE: 125 MMHG | HEART RATE: 115 BPM | DIASTOLIC BLOOD PRESSURE: 76 MMHG | OXYGEN SATURATION: 99 % | TEMPERATURE: 98.3 F

## 2022-11-22 VITALS — DIASTOLIC BLOOD PRESSURE: 84 MMHG | SYSTOLIC BLOOD PRESSURE: 138 MMHG | HEART RATE: 102 BPM

## 2022-11-22 VITALS — HEART RATE: 121 BPM | OXYGEN SATURATION: 99 %

## 2022-11-22 VITALS
DIASTOLIC BLOOD PRESSURE: 76 MMHG | HEART RATE: 117 BPM | RESPIRATION RATE: 16 BRPM | TEMPERATURE: 98.7 F | OXYGEN SATURATION: 99 % | SYSTOLIC BLOOD PRESSURE: 125 MMHG

## 2022-11-22 VITALS — HEART RATE: 112 BPM | SYSTOLIC BLOOD PRESSURE: 133 MMHG | DIASTOLIC BLOOD PRESSURE: 77 MMHG

## 2022-11-22 VITALS — HEART RATE: 111 BPM | SYSTOLIC BLOOD PRESSURE: 133 MMHG | DIASTOLIC BLOOD PRESSURE: 75 MMHG

## 2022-11-22 VITALS — HEART RATE: 110 BPM | SYSTOLIC BLOOD PRESSURE: 122 MMHG | DIASTOLIC BLOOD PRESSURE: 67 MMHG

## 2022-11-22 LAB
DEPRECATED RDW RBC: 46.7 FL (ref 35.1–43.9)
ERYTHROCYTE [DISTWIDTH] IN BLOOD: 14.7 % (ref 11.6–14.6)
HCT VFR BLD AUTO: 24.2 % (ref 37–47)
HEMOGLOBIN: 7.7 G/DL (ref 12–15)
HGB BLD-MCNC: 7.7 G/DL (ref 12–15)
IMMATURE GRANULOCYTES COUNT: 0.14 X10^3/UL (ref 0–0)
MCV RBC: 90.6 FL (ref 81–99)
MEAN CORP HGB CONC: 31.8 G/DL (ref 32–36)
MEAN PLATELET VOL.: 9.7 FL (ref 6.2–12)
NRBC FLAGGED BY ANALYZER: 0.2 % (ref 0–5)
PLATELET # BLD: 133 K/MM3 (ref 150–450)
PLATELET COUNT: 133 K/MM3 (ref 150–450)
RBC # BLD AUTO: 2.67 M/MM3 (ref 4.2–5.4)
RBC DISTRIBUTION WIDTH CV: 14.7 % (ref 11.6–14.6)
RBC DISTRIBUTION WIDTH SD: 46.7 FL (ref 35.1–43.9)
WBC # BLD AUTO: 8.2 K/MM3 (ref 4.4–11)
WHITE BLOOD COUNT: 8.2 K/MM3 (ref 4.4–11)

## 2022-11-22 RX ADMIN — SODIUM CHLORIDE 75 ML: 9 INJECTION, SOLUTION INTRAVENOUS at 01:05

## 2022-11-22 RX ADMIN — DIPHENHYDRAMINE HYDROCHLORIDE 50 MG: 50 INJECTION, SOLUTION INTRAMUSCULAR; INTRAVENOUS at 01:21

## 2023-06-01 LAB — THYROTROPIN (MIU/L) IN SER/PLAS BY DETECTION LIMIT <= 0.05 MIU/L: 2.91 MIU/L (ref 0.44–3.98)

## 2023-10-27 DIAGNOSIS — E06.3 HYPOTHYROIDISM DUE TO HASHIMOTO'S THYROIDITIS: Primary | ICD-10-CM

## 2023-10-27 DIAGNOSIS — E03.8 HYPOTHYROIDISM DUE TO HASHIMOTO'S THYROIDITIS: Primary | ICD-10-CM

## 2023-10-27 RX ORDER — LEVOTHYROXINE SODIUM 112 UG/1
112 TABLET ORAL DAILY
COMMUNITY
End: 2023-10-27 | Stop reason: SDUPTHER

## 2023-10-27 RX ORDER — LEVOTHYROXINE SODIUM 112 UG/1
112 TABLET ORAL
Qty: 30 TABLET | Refills: 5 | Status: SHIPPED | OUTPATIENT
Start: 2023-10-27 | End: 2024-03-18 | Stop reason: DRUGHIGH

## 2024-01-09 ENCOUNTER — LAB (OUTPATIENT)
Dept: LAB | Facility: LAB | Age: 29
End: 2024-01-09
Payer: COMMERCIAL

## 2024-01-09 DIAGNOSIS — E03.9 HYPOTHYROIDISM, UNSPECIFIED: Primary | ICD-10-CM

## 2024-01-09 PROCEDURE — 86800 THYROGLOBULIN ANTIBODY: CPT

## 2024-01-09 PROCEDURE — 86376 MICROSOMAL ANTIBODY EACH: CPT

## 2024-01-09 PROCEDURE — 84439 ASSAY OF FREE THYROXINE: CPT

## 2024-01-09 PROCEDURE — 36415 COLL VENOUS BLD VENIPUNCTURE: CPT

## 2024-01-09 PROCEDURE — 84443 ASSAY THYROID STIM HORMONE: CPT

## 2024-01-09 PROCEDURE — 84480 ASSAY TRIIODOTHYRONINE (T3): CPT

## 2024-01-10 LAB
T3 SERPL-MCNC: 119 NG/DL (ref 60–200)
T4 FREE SERPL-MCNC: 1.11 NG/DL (ref 0.78–1.48)
THYROPEROXIDASE AB SERPL-ACNC: >1000 IU/ML
TSH SERPL-ACNC: 5.12 MIU/L (ref 0.44–3.98)

## 2024-01-10 NOTE — PATIENT INSTRUCTIONS
Thank you for choosing [facility]  for your health care needs.  If you have any questions, concerns or medical needs, please feel free to contact our office at [phone].    Please ensure you complete your blood work one week before the next scheduled appointment.    To continue Levothyroxine 112mcg po daily  Take levothyroxine on an empty stomach with water alone, 30-60 minutes before eating or taking other medications, 4 hours before any calcium or iron supplement  To obtain blood tests in six to eight weeks   For follow up in 6 months

## 2024-01-10 NOTE — PROGRESS NOTES
Subjective   Mrs. Max is a 28-year-old female who presents for follow-up for hypothyroidism as well as bilateral thyroid nodules.     The patient states that she has had a goiter for many years and has had knowledge of thyroid nodules. At the age of 14 she underwent a fine needle aspiration biopsy. She cannot recall which side of her neck the biopsy was done. She had 2 ultrasounds done in 2011 and 2012.     Thyroid ultrasound from January 17, 2018 revealed:  RIGHT LOBE: Benign nodules, however the right-sided lobe measures 5.6 x 2.3 x 2.3 cm. It is again noted to be  diffusely heterogeneous, with multiple small poorly defined hypoechoic foci. Again seen is the predominantly solid slightly hyperechoic nodule in the mid-lower pole, measuring 1.7 x 1.3 x 1.5 cm. Previously, this measured 1.4 x 1.5 x 1.5 cm. LEFT LOBE: The left lobe measures 6.0 x 1.8 x 2.5 cm. It is also noted to be diffusely heterogeneous in appearance, with several subcentimeter hyperechoic nodules. The largest nodule is again noted in the upper pole, measuring 1.1 x 0.9 x 0.8 cm. It is solid and hyperechoic.Previously, this measured 1.0 x 0.7 x 0.8 cm.  ISTHMUS: The isthmus measures 6 mm and is again noted to be heterogeneous.  CERVICAL LYMPH NODES: There is a left level 3 lymph node measuring 2.3 x 1.1 x 0.4 cm. A 2nd left level 3 node measures 1.4 x 0.4 x 1.0 cm. A right level 4 lymph node measures 2.5 x 0.6 x 1.4 cm. These were not well visualized previously.  IMPRESSION:  Again noted are findings likely representing Hashimoto's thyroiditis, with heterogeneous appearance of the bilateral thyroid lobes. Also again seen is a 1.3-1.7 cm heterogeneously hyperechoic solid nodule in the right lobe, which may simply represent a benign nodule, however it is less homogeneous in its hyperechogenicity compared to  previous examination. Correlation is recommended. At least follow-up is recommended. Several hyperechoic solid nodules are again noted in  "the left lobe, likely representing benign nodules. The largest left lobe nodule  measures 0.8-1.1 cm.     A repeat thyroid ultrasound was obtained on April 25, 2019 which revealed no interval change from her previous ultrasound.  Thyroid ultrasound from August 24, 2020 revealed a multinodular goiter without any significant change.      Current Regimen  Levothyroxine 112mcg po daily      Symptomatology related to thyroid disease is as listed below:  Energy levels - good; currently on Z pack for URTI for sinus infection   Sleep - poor quality; can have awakenings  Daytime naps - denies   Temperature intolerances -denies heat   Bowel movements - regular; but not daily   Hair changes - resolved   Skin changes - has acne  Palpitations - denies  Tremors - one in a while if busy and she forgets to eat   Increasing neck size - denies  Dysphagia - denies  Dyspnea upon lying supine - denies  Dysphonia - denies  Weight changes - stable   Mood - good     LMP January 6, 2024  Menses are regular  Cycle length is 28-30 days     Objective   /80 (BP Location: Right arm, Patient Position: Sitting, BP Cuff Size: Adult)   Pulse 95   Ht 1.651 m (5' 5\")   Wt 77 kg (169 lb 12.8 oz)   BMI 28.26 kg/m²    Physical Exam  Vitals and nursing note reviewed.   Constitutional:       General: She is not in acute distress.     Appearance: Normal appearance. She is normal weight.   HENT:      Head: Normocephalic and atraumatic.      Nose: Nose normal.      Mouth/Throat:      Mouth: Mucous membranes are moist.   Eyes:      Extraocular Movements: Extraocular movements intact.   Cardiovascular:      Rate and Rhythm: Normal rate and regular rhythm.   Pulmonary:      Effort: Pulmonary effort is normal.      Breath sounds: Normal breath sounds.   Musculoskeletal:         General: Normal range of motion.   Skin:     General: Skin is warm.   Neurological:      Mental Status: She is alert and oriented to person, place, and time.   Psychiatric:       "   Mood and Affect: Mood normal.         Lab Results   Component Value Date    TSH 5.12 (H) 01/09/2024    FREET4 1.11 01/09/2024       Assessment/Plan   28-year-old female presents for follow-up for Hashimoto's thyroiditis and bilateral thyroid nodules. She was diagnosed with Hashimoto's thyroiditis many years ago. She feels clinically well.  She is currently on a Z pack for an URTI.    Hashimoto's thyroiditis  To continue Levothyroxine 112mcg po daily  Take levothyroxine on an empty stomach with water alone, 30-60 minutes before eating or taking other medications, 4 hours before any calcium or iron supplement  To obtain blood tests in six to eight weeks     For follow up in 6 months

## 2024-01-11 ENCOUNTER — OFFICE VISIT (OUTPATIENT)
Dept: ENDOCRINOLOGY | Facility: CLINIC | Age: 29
End: 2024-01-11
Payer: COMMERCIAL

## 2024-01-11 VITALS
DIASTOLIC BLOOD PRESSURE: 80 MMHG | HEART RATE: 95 BPM | HEIGHT: 65 IN | SYSTOLIC BLOOD PRESSURE: 132 MMHG | BODY MASS INDEX: 28.29 KG/M2 | WEIGHT: 169.8 LBS

## 2024-01-11 DIAGNOSIS — E06.3 HASHIMOTO'S THYROIDITIS: Primary | ICD-10-CM

## 2024-01-11 LAB — THYROGLOB AB SERPL-ACNC: 6.2 IU/ML (ref 0–4)

## 2024-01-11 PROCEDURE — 99213 OFFICE O/P EST LOW 20 MIN: CPT | Performed by: INTERNAL MEDICINE

## 2024-01-11 PROCEDURE — 1036F TOBACCO NON-USER: CPT | Performed by: INTERNAL MEDICINE

## 2024-01-11 RX ORDER — MULTIVITAMIN WITH IRON
TABLET ORAL
COMMUNITY
Start: 2023-07-06

## 2024-01-11 RX ORDER — ERGOCALCIFEROL 1.25 MG/1
1 CAPSULE ORAL
COMMUNITY

## 2024-01-15 PROBLEM — E06.3 HASHIMOTO'S THYROIDITIS: Status: ACTIVE | Noted: 2024-01-15

## 2024-01-15 NOTE — ASSESSMENT & PLAN NOTE
To continue Levothyroxine 112mcg po daily  Take levothyroxine on an empty stomach with water alone, 30-60 minutes before eating or taking other medications, 4 hours before any calcium or iron supplement  To obtain blood tests in six to eight weeks     For follow up in 6 months

## 2024-03-05 ENCOUNTER — LAB (OUTPATIENT)
Dept: LAB | Facility: LAB | Age: 29
End: 2024-03-05
Payer: COMMERCIAL

## 2024-03-05 DIAGNOSIS — E06.3 HASHIMOTO'S THYROIDITIS: ICD-10-CM

## 2024-03-05 PROCEDURE — 84443 ASSAY THYROID STIM HORMONE: CPT

## 2024-03-05 PROCEDURE — 36415 COLL VENOUS BLD VENIPUNCTURE: CPT

## 2024-03-06 LAB — TSH SERPL-ACNC: 3.44 MIU/L (ref 0.44–3.98)

## 2024-03-14 ENCOUNTER — TELEPHONE (OUTPATIENT)
Dept: ENDOCRINOLOGY | Facility: CLINIC | Age: 29
End: 2024-03-14
Payer: COMMERCIAL

## 2024-03-14 NOTE — TELEPHONE ENCOUNTER
Patient called to check on lab results. A message was previously sent via [employer].     Bailee Dave   to [facility] Clinical Support Staff (supporting Jose Roberto Buchanan MD)   HR      3/6/24  8:12 AM  Hi Dr Buchanan,  I just wanted to touch base with you since I just had my blood work done. I wanted you to know that I think my levels may be off as I am more tired than normal and having trouble losing weight.   Thanks,  Bailee Corrales MA   to Bailee Dave         3/6/24  8:39 AM  Good morning!  Once Dr. Buchanan reviews the labs, she will send you a message on [employer].     Last read by Bailee Dave at  8:40 AM on 3/6/2024.

## 2024-03-18 DIAGNOSIS — E06.3 HASHIMOTO'S THYROIDITIS: Primary | ICD-10-CM

## 2024-03-18 RX ORDER — LEVOTHYROXINE SODIUM 125 UG/1
125 TABLET ORAL DAILY
Qty: 30 TABLET | Refills: 11 | Status: SHIPPED | OUTPATIENT
Start: 2024-03-18 | End: 2025-03-18

## 2024-07-18 ENCOUNTER — APPOINTMENT (OUTPATIENT)
Dept: ENDOCRINOLOGY | Facility: CLINIC | Age: 29
End: 2024-07-18
Payer: COMMERCIAL

## 2024-07-18 ENCOUNTER — LAB (OUTPATIENT)
Dept: LAB | Facility: LAB | Age: 29
End: 2024-07-18
Payer: COMMERCIAL

## 2024-07-18 VITALS
WEIGHT: 178 LBS | HEIGHT: 65 IN | SYSTOLIC BLOOD PRESSURE: 138 MMHG | BODY MASS INDEX: 29.66 KG/M2 | DIASTOLIC BLOOD PRESSURE: 70 MMHG | HEART RATE: 85 BPM

## 2024-07-18 DIAGNOSIS — Z13.1 SCREENING FOR DIABETES MELLITUS: ICD-10-CM

## 2024-07-18 DIAGNOSIS — E06.3 HASHIMOTO'S THYROIDITIS: Primary | ICD-10-CM

## 2024-07-18 DIAGNOSIS — Z00.00 HEALTHCARE MAINTENANCE: ICD-10-CM

## 2024-07-18 DIAGNOSIS — E06.3 HASHIMOTO'S THYROIDITIS: ICD-10-CM

## 2024-07-18 DIAGNOSIS — Z00.00 HEALTH CARE MAINTENANCE: ICD-10-CM

## 2024-07-18 LAB
25(OH)D3 SERPL-MCNC: 26 NG/ML (ref 30–100)
ALBUMIN SERPL BCP-MCNC: 4.6 G/DL (ref 3.4–5)
ALP SERPL-CCNC: 58 U/L (ref 33–110)
ALT SERPL W P-5'-P-CCNC: 14 U/L (ref 7–45)
ANION GAP SERPL CALC-SCNC: 9 MMOL/L (ref 10–20)
AST SERPL W P-5'-P-CCNC: 14 U/L (ref 9–39)
BILIRUB SERPL-MCNC: 0.3 MG/DL (ref 0–1.2)
BUN SERPL-MCNC: 9 MG/DL (ref 6–23)
CALCIUM SERPL-MCNC: 10.6 MG/DL (ref 8.6–10.3)
CHLORIDE SERPL-SCNC: 108 MMOL/L (ref 98–107)
CO2 SERPL-SCNC: 24 MMOL/L (ref 21–32)
CREAT SERPL-MCNC: 0.73 MG/DL (ref 0.5–1.05)
EGFRCR SERPLBLD CKD-EPI 2021: >90 ML/MIN/1.73M*2
ERYTHROCYTE [DISTWIDTH] IN BLOOD BY AUTOMATED COUNT: 13.4 % (ref 11.5–14.5)
GLUCOSE SERPL-MCNC: 91 MG/DL (ref 74–99)
HCT VFR BLD AUTO: 43.1 % (ref 36–46)
HGB BLD-MCNC: 13.8 G/DL (ref 12–16)
IRON SATN MFR SERPL: 9 %
IRON SERPL-MCNC: 32 UG/DL (ref 35–150)
MCH RBC QN AUTO: 28.3 PG (ref 26–34)
MCHC RBC AUTO-ENTMCNC: 32 G/DL (ref 32–36)
MCV RBC AUTO: 88 FL (ref 80–100)
NRBC BLD-RTO: 0.3 /100 WBCS (ref 0–0)
PLATELET # BLD AUTO: 215 X10*3/UL (ref 150–450)
POTASSIUM SERPL-SCNC: 4.3 MMOL/L (ref 3.5–5.3)
PROT SERPL-MCNC: 7.9 G/DL (ref 6.4–8.2)
RBC # BLD AUTO: 4.88 X10*6/UL (ref 4–5.2)
SODIUM SERPL-SCNC: 137 MMOL/L (ref 136–145)
TIBC SERPL-MCNC: 363 UG/DL
TSH SERPL-ACNC: 3.65 MIU/L (ref 0.44–3.98)
UIBC SERPL-MCNC: 331 UG/DL (ref 110–370)
WBC # BLD AUTO: 6.1 X10*3/UL (ref 4.4–11.3)

## 2024-07-18 PROCEDURE — 85027 COMPLETE CBC AUTOMATED: CPT

## 2024-07-18 PROCEDURE — 3008F BODY MASS INDEX DOCD: CPT | Performed by: INTERNAL MEDICINE

## 2024-07-18 PROCEDURE — 83036 HEMOGLOBIN GLYCOSYLATED A1C: CPT

## 2024-07-18 PROCEDURE — 99214 OFFICE O/P EST MOD 30 MIN: CPT | Performed by: INTERNAL MEDICINE

## 2024-07-18 PROCEDURE — 83550 IRON BINDING TEST: CPT

## 2024-07-18 PROCEDURE — 36415 COLL VENOUS BLD VENIPUNCTURE: CPT

## 2024-07-18 PROCEDURE — 82306 VITAMIN D 25 HYDROXY: CPT

## 2024-07-18 PROCEDURE — 80053 COMPREHEN METABOLIC PANEL: CPT

## 2024-07-18 PROCEDURE — 84443 ASSAY THYROID STIM HORMONE: CPT

## 2024-07-18 PROCEDURE — 83540 ASSAY OF IRON: CPT

## 2024-07-18 NOTE — PATIENT INSTRUCTIONS
Thank you for choosing [facility]  for your health care needs.  If you have any questions, concerns or medical needs, please feel free to contact our office at [phone].    Please ensure you complete your blood work one week before the next scheduled appointment.    To continue Levothyroxine 125mcg po daily  Take levothyroxine on an empty stomach with water alone, 30-60 minutes before eating or taking other medications, 4 hours before any calcium or iron supplement  To obtain blood tests today   For follow up in 6 months

## 2024-07-18 NOTE — PROGRESS NOTES
Subjective   Mrs. Max is a 28-year-old female who presents for follow-up for hypothyroidism as well as bilateral thyroid nodules.     The patient states that she has had a goiter for many years and has had knowledge of thyroid nodules. At the age of 14 she underwent a fine needle aspiration biopsy.      Thyroid ultrasound from January 17, 2018 revealed:  RIGHT LOBE: Benign nodules, however the right-sided lobe measures 5.6 x 2.3 x 2.3 cm. It is again noted to be  diffusely heterogeneous, with multiple small poorly defined hypoechoic foci. Again seen is the predominantly solid slightly hyperechoic nodule in the mid-lower pole, measuring 1.7 x 1.3 x 1.5 cm. Previously, this measured 1.4 x 1.5 x 1.5 cm. LEFT LOBE: The left lobe measures 6.0 x 1.8 x 2.5 cm. It is also noted to be diffusely heterogeneous in appearance, with several subcentimeter hyperechoic nodules. The largest nodule is again noted in the upper pole, measuring 1.1 x 0.9 x 0.8 cm. It is solid and hyperechoic.Previously, this measured 1.0 x 0.7 x 0.8 cm.  ISTHMUS: The isthmus measures 6 mm and is again noted to be heterogeneous.  CERVICAL LYMPH NODES: There is a left level 3 lymph node measuring 2.3 x 1.1 x 0.4 cm. A 2nd left level 3 node measures 1.4 x 0.4 x 1.0 cm. A right level 4 lymph node measures 2.5 x 0.6 x 1.4 cm. These were not well visualized previously.  IMPRESSION:  Again noted are findings likely representing Hashimoto's thyroiditis, with heterogeneous appearance of the bilateral thyroid lobes. Also again seen is a 1.3-1.7 cm heterogeneously hyperechoic solid nodule in the right lobe, which may simply represent a benign nodule, however it is less homogeneous in its hyperechogenicity compared to  previous examination. Correlation is recommended. At least follow-up is recommended. Several hyperechoic solid nodules are again noted in the left lobe, likely representing benign nodules. The largest left lobe nodule  measures 0.8-1.1 cm.     A  "repeat thyroid ultrasound was obtained on April 25, 2019 which revealed no interval change from her previous ultrasound.  Thyroid ultrasound from August 24, 2020 revealed a multinodular goiter without any significant change.      Current Regimen  Levothyroxine 125mcg po daily      Symptomatology related to thyroid disease is as listed below:  Energy levels - fatigued  Sleep -  denies  awakenings  Daytime naps - denies   Temperature intolerances -denies heat   Bowel movements - regular; but not daily   Hair changes - denies    Skin changes - has acne  Palpitations - denies  Tremors - denies   Increasing neck size - denies  Dysphagia - denies  Dyspnea upon lying supine - denies  Dysphonia - denies  Weight changes - gained      Exercise regimen - walking with the kids - 30 mins     Review of Systems   Cardiovascular:  Negative for leg swelling.   All other systems reviewed and are negative.    Objective   /70 (BP Location: Left arm, Patient Position: Sitting, BP Cuff Size: Adult)   Pulse 85   Ht 1.651 m (5' 5\")   Wt 80.7 kg (178 lb)   BMI 29.62 kg/m²    Physical Exam  Vitals and nursing note reviewed.   Constitutional:       General: She is not in acute distress.     Appearance: Normal appearance. She is normal weight.   HENT:      Head: Normocephalic and atraumatic.      Nose: Nose normal.      Mouth/Throat:      Mouth: Mucous membranes are moist.   Eyes:      Extraocular Movements: Extraocular movements intact.   Cardiovascular:      Rate and Rhythm: Normal rate and regular rhythm.   Pulmonary:      Effort: Pulmonary effort is normal.      Breath sounds: Normal breath sounds.   Musculoskeletal:         General: Normal range of motion.   Skin:     General: Skin is warm.   Neurological:      Mental Status: She is alert and oriented to person, place, and time.   Psychiatric:         Mood and Affect: Mood normal.         Lab Results   Component Value Date    TSH 3.65 07/18/2024    FREET4 1.11 01/09/2024 "       Assessment/Plan   28-year-old female presents for follow-up for Hashimoto's thyroiditis and bilateral thyroid nodules. She was diagnosed with Hashimoto's thyroiditis many years ago.     Hashimoto's thyroiditis  To continue Levothyroxine 125mcg po daily  Take levothyroxine on an empty stomach with water alone, 30-60 minutes before eating or taking other medications, 4 hours before any calcium or iron supplement  To obtain blood tests today          Screening for diabetes mellitus  TO obtain A1C and BMP values     Health care maintenance  To obtain blood tests given lack of follow up with PCP    For follow up in 6 months

## 2024-07-19 LAB
EST. AVERAGE GLUCOSE BLD GHB EST-MCNC: 100 MG/DL
HBA1C MFR BLD: 5.1 %

## 2024-07-22 DIAGNOSIS — E06.3 HASHIMOTO'S THYROIDITIS: ICD-10-CM

## 2024-07-22 RX ORDER — LEVOTHYROXINE SODIUM 125 UG/1
TABLET ORAL
Qty: 32 TABLET | Refills: 6 | Status: SHIPPED | OUTPATIENT
Start: 2024-07-22

## 2024-07-24 PROBLEM — Z00.00 HEALTHCARE MAINTENANCE: Status: ACTIVE | Noted: 2024-07-24

## 2024-07-24 PROBLEM — Z00.00 HEALTH CARE MAINTENANCE: Status: ACTIVE | Noted: 2024-07-24

## 2024-07-24 PROBLEM — Z13.1 SCREENING FOR DIABETES MELLITUS: Status: ACTIVE | Noted: 2024-07-24

## 2024-07-25 NOTE — ASSESSMENT & PLAN NOTE
To continue Levothyroxine 125mcg po daily  Take levothyroxine on an empty stomach with water alone, 30-60 minutes before eating or taking other medications, 4 hours before any calcium or iron supplement  To obtain blood tests today

## 2024-11-05 ENCOUNTER — TELEPHONE (OUTPATIENT)
Dept: ENDOCRINOLOGY | Facility: CLINIC | Age: 29
End: 2024-11-05
Payer: COMMERCIAL

## 2024-11-18 NOTE — TELEPHONE ENCOUNTER
Patient call back to request lab orders. She reports lack of sleep, shakiness, and hair shedding. She has also lost 15lbs in the last few months (current weight 164 lbs). She believes her medication needs adjustment. Please place lab orders as soon as possible.

## 2024-11-19 DIAGNOSIS — E06.3 HASHIMOTO'S THYROIDITIS: Primary | ICD-10-CM

## 2024-11-20 ENCOUNTER — LAB (OUTPATIENT)
Dept: LAB | Facility: LAB | Age: 29
End: 2024-11-20
Payer: COMMERCIAL

## 2024-11-20 DIAGNOSIS — E06.3 HASHIMOTO'S THYROIDITIS: ICD-10-CM

## 2024-11-20 LAB
T4 FREE SERPL-MCNC: 1.31 NG/DL (ref 0.78–1.48)
TSH SERPL-ACNC: 3.4 MIU/L (ref 0.44–3.98)

## 2024-11-20 PROCEDURE — 84439 ASSAY OF FREE THYROXINE: CPT

## 2024-11-20 PROCEDURE — 84443 ASSAY THYROID STIM HORMONE: CPT

## 2024-11-20 PROCEDURE — 36415 COLL VENOUS BLD VENIPUNCTURE: CPT

## 2024-11-22 NOTE — RESULT ENCOUNTER NOTE
Labs have been reviewed.  The thyroid levels are normal and thus a dose adjustment is not warranted as of yet.

## 2025-02-20 ENCOUNTER — TELEPHONE (OUTPATIENT)
Dept: ENDOCRINOLOGY | Facility: CLINIC | Age: 30
End: 2025-02-20
Payer: COMMERCIAL

## 2025-02-20 DIAGNOSIS — E06.3 HASHIMOTO'S THYROIDITIS: ICD-10-CM

## 2025-02-20 RX ORDER — LEVOTHYROXINE SODIUM 125 UG/1
125 TABLET ORAL DAILY
Qty: 90 TABLET | Refills: 1 | Status: SHIPPED | OUTPATIENT
Start: 2025-02-20 | End: 2025-08-19

## 2025-02-20 NOTE — TELEPHONE ENCOUNTER
Bailee would like to know if she can get labs done before her appointment on 3/18/25? She would like to get her levels checked.

## 2025-02-20 NOTE — TELEPHONE ENCOUNTER
Next appointment 3/18/25.    Bailee would like a refill on her Levothyroxine 125 mcg sent to [id number] in [place].

## 2025-03-13 LAB
T4 FREE SERPL-MCNC: 1 NG/DL (ref 0.8–1.8)
TSH SERPL-ACNC: 1.58 MIU/L

## 2025-03-18 ENCOUNTER — APPOINTMENT (OUTPATIENT)
Dept: ENDOCRINOLOGY | Facility: CLINIC | Age: 30
End: 2025-03-18
Payer: COMMERCIAL

## 2025-03-18 VITALS
HEART RATE: 96 BPM | DIASTOLIC BLOOD PRESSURE: 74 MMHG | SYSTOLIC BLOOD PRESSURE: 122 MMHG | HEIGHT: 65 IN | BODY MASS INDEX: 29.02 KG/M2 | WEIGHT: 174.2 LBS

## 2025-03-18 DIAGNOSIS — E06.3 HASHIMOTO'S THYROIDITIS: ICD-10-CM

## 2025-03-18 PROCEDURE — 99213 OFFICE O/P EST LOW 20 MIN: CPT | Performed by: INTERNAL MEDICINE

## 2025-03-18 PROCEDURE — 3008F BODY MASS INDEX DOCD: CPT | Performed by: INTERNAL MEDICINE

## 2025-03-18 RX ORDER — LEVOTHYROXINE SODIUM 125 UG/1
TABLET ORAL
Qty: 32 TABLET | Refills: 5 | Status: SHIPPED | OUTPATIENT
Start: 2025-03-18

## 2025-03-18 RX ORDER — LEVOTHYROXINE SODIUM 125 UG/1
125 TABLET ORAL DAILY
Qty: 32 TABLET | Refills: 5 | Status: SHIPPED | OUTPATIENT
Start: 2025-03-18 | End: 2025-03-18

## 2025-03-18 NOTE — PROGRESS NOTES
Subjective   Mrs. Max is a 29-year-old female who presents for follow-up for hypothyroidism as well as bilateral thyroid nodules.     The patient states that she has had a goiter for many years and has had knowledge of thyroid nodules. At the age of 14 she underwent a fine needle aspiration biopsy.      Thyroid ultrasound from January 17, 2018 revealed:  RIGHT LOBE: Benign nodules, however the right-sided lobe measures 5.6 x 2.3 x 2.3 cm. It is again noted to be  diffusely heterogeneous, with multiple small poorly defined hypoechoic foci. Again seen is the predominantly solid slightly hyperechoic nodule in the mid-lower pole, measuring 1.7 x 1.3 x 1.5 cm. Previously, this measured 1.4 x 1.5 x 1.5 cm. LEFT LOBE: The left lobe measures 6.0 x 1.8 x 2.5 cm. It is also noted to be diffusely heterogeneous in appearance, with several subcentimeter hyperechoic nodules. The largest nodule is again noted in the upper pole, measuring 1.1 x 0.9 x 0.8 cm. It is solid and hyperechoic.Previously, this measured 1.0 x 0.7 x 0.8 cm.  ISTHMUS: The isthmus measures 6 mm and is again noted to be heterogeneous.  CERVICAL LYMPH NODES: There is a left level 3 lymph node measuring 2.3 x 1.1 x 0.4 cm. A 2nd left level 3 node measures 1.4 x 0.4 x 1.0 cm. A right level 4 lymph node measures 2.5 x 0.6 x 1.4 cm. These were not well visualized previously.  IMPRESSION:  Again noted are findings likely representing Hashimoto's thyroiditis, with heterogeneous appearance of the bilateral thyroid lobes. Also again seen is a 1.3-1.7 cm heterogeneously hyperechoic solid nodule in the right lobe, which may simply represent a benign nodule, however it is less homogeneous in its hyperechogenicity compared to  previous examination. Correlation is recommended. At least follow-up is recommended. Several hyperechoic solid nodules are again noted in the left lobe, likely representing benign nodules. The largest left lobe nodule  measures 0.8-1.1 cm.     A  "repeat thyroid ultrasound was obtained on April 25, 2019 which revealed no interval change from her previous ultrasound.  Thyroid ultrasound from August 24, 2020 revealed a multinodular goiter without any significant change.      Current Regimen  Levothyroxine 125mcg po daily for 5 days per week and take 1.5 tablets daily for 2 days per week     She desires to have one more child    Symptomatology related to thyroid disease is as listed below:  Energy levels - good   Sleep -  denies  awakenings  Temperature intolerances -denies   Bowel movements - regular; but not daily   Hair changes - denies    Skin changes - denies   Palpitations - denies  Tremors - denies   Increasing neck size - denies  Dysphagia - denies  Dyspnea upon lying supine - denies  Dysphonia - denies  Weight changes - lost; was down to 160 lbs with [employer]  but she was limited by hunger      Review of Systems   All other systems reviewed and are negative.    Objective   /74   Pulse 96   Ht 1.651 m (5' 5\")   Wt 79 kg (174 lb 3.2 oz)   BMI 28.99 kg/m²    Physical Exam  Vitals and nursing note reviewed.   Constitutional:       General: She is not in acute distress.     Appearance: Normal appearance. She is normal weight.   HENT:      Head: Normocephalic and atraumatic.      Nose: Nose normal.      Mouth/Throat:      Mouth: Mucous membranes are moist.   Eyes:      Extraocular Movements: Extraocular movements intact.   Cardiovascular:      Rate and Rhythm: Normal rate and regular rhythm.   Pulmonary:      Effort: Pulmonary effort is normal.      Breath sounds: Normal breath sounds.   Musculoskeletal:         General: Normal range of motion.   Skin:     General: Skin is warm.   Neurological:      Mental Status: She is alert and oriented to person, place, and time.      Comments: No tremors to outstretched hands     Psychiatric:         Mood and Affect: Mood normal.         Lab Results   Component Value Date    TSH 1.58 03/12/2025    FREET4 " 1.0 03/12/2025         Component  Ref Range & Units 1 yr ago   Thyroid Peroxidase (TPO) Antibody  <=60 IU/mL >1,000 High    Resulting Agency [facility]        Imaging  === 08/24/20 ===    US THYROID  FINDINGS:  The thyroid is enlarged and heterogeneous bilaterally in appearance,  with multiple nodules in the left thyroid identified.     RIGHT LOBE:  The right lobe of the thyroid measures 5 cm x 1.9 cm x 2.2 cm. The  right lobe of the thyroid is heterogenous in echotexture and  demonstrates a dominant solid 1.7 x 1.3 x 1.5 cm nodule in the mid  posterior right lobe, not significantly changed from prior ultrasound.     LEFT LOBE:  The left lobe of the thyroid measures 6.4 x 1.9 x 2.5 cm. The left  lobe of the thyroid is heterogenous in echotexture and demonstrates  multiple nodules. There is a superior solid nodule, measuring 1.3 x  1.1 x 1.1 cm, not significantly changed from prior ultrasound.  Anterior medial nodule, measuring 1.1 x 0.6 x 0.9 cm. There a  posteromedial solid nodule, measuring 0.9 x 0.6 x 0.8 cm.     ISTHMUS:  The isthmus measures approximately 0.4 cm and is heterogenous in  echotexture and without any identifiable nodules.     CERVICAL LYMPH NODES:  There is a enlarged cervical left lymph nodes in zone 7, measuring  1.5 x 0.6 x 1.1 cm.     IMPRESSION:  Enlarged thyroid gland with stable multiple nodules consistent with  multinodular goiter.      Assessment/Plan   29-year-old female presents for follow-up for Hashimoto's thyroiditis and bilateral thyroid nodules. She was diagnosed with Hashimoto's thyroiditis many years ago.  She is euthyroid.    Hashimoto's thyroiditis  To continue Levothyroxine 125mcg po daily for 5 days per week and take 1.5 tablets daily for 2 days per week   Take levothyroxine on an empty stomach with water alone, 30-60 minutes before eating or taking other medications, 4 hours before any calcium or iron supplement  To obtain blood tests before the next appointment  PLEASE CALL  SHOULD YOU BECOME PREGNANT  Counseled that thyroid requirements typically increase in pregnancy by 30 to 50%     For follow up in 6 months

## 2025-03-18 NOTE — PATIENT INSTRUCTIONS
Thank you for choosing [facility]  for your health care needs.  If you have any questions, concerns or medical needs, please feel free to contact our office at [phone].    Please ensure you complete your blood work one week before the next scheduled appointment.    To continue Levothyroxine 125mcg po daily for 5 days per week and take 1.5 tablets daily for 2 days per week   Take levothyroxine on an empty stomach with water alone, 30-60 minutes before eating or taking other medications, 4 hours before any calcium or iron supplement  To obtain blood tests before the next appointment  PLEASE CALL SHOULD YOU BECOME PREGNANT     For follow up in 6 months

## 2025-03-21 NOTE — ASSESSMENT & PLAN NOTE
To continue Levothyroxine 125mcg po daily for 5 days per week and take 1.5 tablets daily for 2 days per week   Take levothyroxine on an empty stomach with water alone, 30-60 minutes before eating or taking other medications, 4 hours before any calcium or iron supplement  To obtain blood tests before the next appointment  PLEASE CALL SHOULD YOU BECOME PREGNANT  Counseled that thyroid requirements typically increase in pregnancy by 30 to 50%     For follow up in 6 months

## 2025-04-10 ENCOUNTER — APPOINTMENT (OUTPATIENT)
Dept: PRIMARY CARE | Facility: CLINIC | Age: 30
End: 2025-04-10
Payer: COMMERCIAL

## 2025-04-10 VITALS
DIASTOLIC BLOOD PRESSURE: 83 MMHG | WEIGHT: 172 LBS | SYSTOLIC BLOOD PRESSURE: 139 MMHG | TEMPERATURE: 97.7 F | OXYGEN SATURATION: 98 % | RESPIRATION RATE: 16 BRPM | BODY MASS INDEX: 28.66 KG/M2 | HEART RATE: 80 BPM | HEIGHT: 65 IN

## 2025-04-10 DIAGNOSIS — Z13.220 SCREENING FOR HYPERLIPIDEMIA: ICD-10-CM

## 2025-04-10 DIAGNOSIS — E06.3 HASHIMOTO'S THYROIDITIS: ICD-10-CM

## 2025-04-10 DIAGNOSIS — E83.52 SERUM CALCIUM ELEVATED: ICD-10-CM

## 2025-04-10 DIAGNOSIS — Z00.00 PHYSICAL EXAM, ANNUAL: Primary | ICD-10-CM

## 2025-04-12 ASSESSMENT — ENCOUNTER SYMPTOMS
ADENOPATHY: 0
FEVER: 0
COUGH: 0
BRUISES/BLEEDS EASILY: 0
HEMATURIA: 0
PALPITATIONS: 0
HEADACHES: 0
LIGHT-HEADEDNESS: 0
DYSURIA: 0
UNEXPECTED WEIGHT CHANGE: 0
SHORTNESS OF BREATH: 0

## 2025-04-12 NOTE — PROGRESS NOTES
" Subjective   Patient ID: Bailee Dave is a 29 y.o. female who presents for Annual Exam.  HPI  NEW to the practice .   No recent PCP ,  sees Gyn and Endo .  Wanted to establish    Thyroiditis ,hypothyroid:    Has been seeing a Endocrinologist for Thyroid labwork and med refills.  Would prefer to do followup with me    Thyroid nodule   No sxs .  Not aware of it. Has had periodic measurements, nothing has changed.     SOCH  .   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  Exercise- nothing formal. Stays busy   No tob/ vaping /etoh     Review of Systems   Constitutional:  Negative for fever and unexpected weight change.   HENT:  Negative for dental problem.    Eyes:  Negative for visual disturbance.   Respiratory:  Negative for cough and shortness of breath.    Cardiovascular:  Negative for chest pain and palpitations.   Genitourinary:  Negative for dysuria and hematuria.   Skin:  Negative for rash.   Neurological:  Negative for syncope, light-headedness and headaches.   Hematological:  Negative for adenopathy. Does not bruise/bleed easily.       Objective   /83 (BP Location: Left arm, Patient Position: Sitting, BP Cuff Size: Adult)   Pulse 80   Temp 36.5 °C (97.7 °F) (Temporal)   Resp 16   Ht 1.651 m (5' 5\")   Wt 78 kg (172 lb)   LMP 03/20/2025 (Exact Date)   SpO2 98%   BMI 28.62 kg/m²     Physical Exam  Vitals and nursing note reviewed.   Constitutional:       General: She is not in acute distress.     Appearance: Normal appearance.   HENT:      Head: Normocephalic and atraumatic.      Right Ear: Tympanic membrane normal.      Left Ear: Tympanic membrane normal.   Neck:      Comments: No thyromegaly or thryoid tenderness to palpation   Cardiovascular:      Rate and Rhythm: Normal rate and regular rhythm.      Heart sounds: Normal heart sounds.   Pulmonary:      Breath sounds: Normal breath sounds.   Abdominal:      General: Bowel sounds are normal.      Palpations: Abdomen is soft.   Musculoskeletal:        "  General: Normal range of motion.      Cervical back: Neck supple.   Neurological:      General: No focal deficit present.      Mental Status: She is alert and oriented to person, place, and time.          Assessment/Plan   Problem List Items Addressed This Visit          Medium    Hashimoto's thyroiditis    Relevant Orders    Thyroxine, Free    Thyroid Stimulating Hormone    Triiodothyronine, Free     Other Visit Diagnoses       Physical exam, annual    -  Primary    Screening for hyperlipidemia        Serum calcium elevated               Wellness  - preventive care and health maintenance reviewed and discussed     Hypothyroid  - I can refill meds and monitor labwork     Multinodular goiter  -  no intervention at this time.     Pt wondering about Endocrinologist closer to home. Will need one when she gets pregnant .   Recommend she notify me if/ when pregnant       Followup Annual  RICHARD Bomwan MD

## 2025-09-18 ENCOUNTER — APPOINTMENT (OUTPATIENT)
Dept: ENDOCRINOLOGY | Facility: CLINIC | Age: 30
End: 2025-09-18
Payer: COMMERCIAL

## 2026-04-15 ENCOUNTER — APPOINTMENT (OUTPATIENT)
Dept: PRIMARY CARE | Facility: CLINIC | Age: 31
End: 2026-04-15
Payer: COMMERCIAL